# Patient Record
Sex: FEMALE | Race: WHITE | Employment: OTHER | URBAN - METROPOLITAN AREA
[De-identification: names, ages, dates, MRNs, and addresses within clinical notes are randomized per-mention and may not be internally consistent; named-entity substitution may affect disease eponyms.]

---

## 2017-03-06 NOTE — PATIENT DISCUSSION
Discussed AREDS and recommended continue Macular Shield AREDS2 vitamins; recommend daily Amsler grid use; discussed no smoking or second-hand smoke.

## 2017-04-17 NOTE — PATIENT DISCUSSION
Avastin injection # 13 (2 of 2)recommended today after discussion of benefits, risks, alternatives, and off-label use. The injection was administered without complication. Post-injection instructions were reviewed and understood by the patient.

## 2017-04-17 NOTE — PROCEDURE NOTE: CLINICAL
PROCEDURE NOTE: Avastin () (2 of 2 ) OD. Diagnosis: Neovascular AMD with Active CNV. Prep: Betadine Flush. Prior to the original injection, risks/benefits/alternatives discussed including infection, loss of vision, hemorrhage, cataract, glaucoma, retinal tears or detachment. A written consent is on file, and the need for today’s injection was discussed and the patient is understanding and wishes to proceed. The off-label status of Intravitreal Avastin also was reviewed. The patient wished to proceed with treatment. The patient wished to proceed with treatment. Topical anesthetic drops were applied to the eye. Betadine prep was performed. Surgical mask worn. Sterile drape and lid speculum were applied. Using the syringe provided, Avastin 1.25 mg in 0.05 cc was injected into the vitreous cavity. Injection site: 3-4 mm from the limbus. Patient tolerated procedure well. Following the intravitreal injection, the sterile lid speculum was removed. CRA perfusion confirmed. CF vision checked. Patient given office phone number/answering service number and advised to call immediately should there be an increase in floaters or redness, loss of vision or pain, or should they have any other questions or concerns. Richelle Tobar

## 2017-05-09 ENCOUNTER — FOLLOW UP (OUTPATIENT)
Dept: URBAN - METROPOLITAN AREA CLINIC 26 | Facility: CLINIC | Age: 82
End: 2017-05-09

## 2017-05-09 VITALS
BODY MASS INDEX: 29.07 KG/M2 | HEIGHT: 61 IN | SYSTOLIC BLOOD PRESSURE: 158 MMHG | WEIGHT: 154 LBS | HEART RATE: 96 BPM | DIASTOLIC BLOOD PRESSURE: 90 MMHG

## 2017-05-09 DIAGNOSIS — H26.491: ICD-10-CM

## 2017-05-09 DIAGNOSIS — H04.123: ICD-10-CM

## 2017-05-09 DIAGNOSIS — H35.372: ICD-10-CM

## 2017-05-09 DIAGNOSIS — H43.813: ICD-10-CM

## 2017-05-09 DIAGNOSIS — H35.3112: ICD-10-CM

## 2017-05-09 DIAGNOSIS — H35.62: ICD-10-CM

## 2017-05-09 DIAGNOSIS — H35.3223: ICD-10-CM

## 2017-05-09 PROCEDURE — 92134 CPTRZ OPH DX IMG PST SGM RTA: CPT

## 2017-05-09 PROCEDURE — 1036F TOBACCO NON-USER: CPT

## 2017-05-09 PROCEDURE — 92250 FUNDUS PHOTOGRAPHY W/I&R: CPT

## 2017-05-09 PROCEDURE — G8417 CALC BMI ABV UP PARAM F/U: HCPCS

## 2017-05-09 PROCEDURE — 92014 COMPRE OPH EXAM EST PT 1/>: CPT

## 2017-05-09 PROCEDURE — 92235 FLUORESCEIN ANGRPH MLTIFRAME: CPT

## 2017-05-09 PROCEDURE — 4177F TALK PT/CRGVR RE AREDS PREV: CPT

## 2017-05-09 PROCEDURE — 2019F DILATED MACUL EXAM DONE: CPT

## 2017-05-09 PROCEDURE — G8427 DOCREV CUR MEDS BY ELIG CLIN: HCPCS

## 2017-05-09 ASSESSMENT — TONOMETRY
OD_IOP_MMHG: 14
OS_IOP_MMHG: 18

## 2017-05-09 ASSESSMENT — VISUAL ACUITY
OD_SC: 20/30+2
OS_SC: CF 6FT

## 2017-06-12 NOTE — PATIENT DISCUSSION
Swelling worse than prior to last treatment. Will need additional treatment. Sub-retinal swelling is the hardest type of swelling to treat.

## 2017-06-12 NOTE — PATIENT DISCUSSION
Given the poor response to treatment, a change of the Anti-VEGF medication was recommended. Eylea hasn't been used for patient since 2015. Recommend series of 3 Eylea 2 month apart. Jarocho Ragsdale contacted to call patient to call [employer] to help with 10% copay.

## 2017-07-10 NOTE — PATIENT DISCUSSION
Given the poor response to treatment, a change of the Anti-VEGF medication was recommended at last visit. Jono Seymour contacted to call patient to call [employer] to help with 10% copay, **PT DID NOT QUALIFY FOR ASSISTANCE PROGRAM FOR EYLEA AND OWES OVER $200 PER INJECTION OF EYLEA. ** PT SPOKE WITH JARET TOLBERT 2 ABOUT OOP EXPENSE.  WM edu that Eylea DID help as shown on OCT and does rec cont. with at least another one today and change to a SERIES OF 2 INSTEAD OF 3.  at next OV we will re-eval for ? another injection of Eylea vs. Avastin depending on if pt wishes to proceed due to large OOP.

## 2017-07-10 NOTE — PROCEDURE NOTE: CLINICAL
PROCEDURE NOTE: Eylea (2 of 2) #5 OD. Diagnosis: Neovascular AMD with Active CNV. Prep: Betadine Flush. Prior to injection, risks/benefits/alternatives discussed including corneal abrasion, infection, loss of vision, hemorrhage, cataract, glaucoma, retinal tears or detachment. A written consent is on file, and the need for today’s injection was discussed and the patient is understanding and wishes to proceed. The entire vial of Eylea was drawn up into a syringe. The opened vial, remaining drug, and filtered needle were disposed of in a certified biohazard container. Betadine prep was performed. Topical anesthesia was induced with Alcaine. 4% lidocaine pledge. A lid speculum was used. A short 30g needle on a 1cc syringe was used with product that that had previously been prepared under sterile conditions. Injection site: 3-4 mm from the limbus. The used syringe/needle was transferred to a biohazard container. Lid speculum removed. Mask worn during procedure. Patient tolerated procedure well. Count fingers vision was verified. There were no complications. Patient was given the standard instruction sheet. Patient given office phone number/answering service number and advised to call immediately should there be loss of vision or pain, or should they have any other questions or concerns. Mike Max

## 2017-08-21 NOTE — PROCEDURE NOTE: CLINICAL
PROCEDURE NOTE: Avastin () (1 of 3) #14 OD. Diagnosis: Neovascular AMD with Active CNV. Prep: Betadine Flush. Prior to the original injection, risks/benefits/alternatives discussed including infection, loss of vision, hemorrhage, cataract, glaucoma, retinal tears or detachment. A written consent is on file, and the need for today’s injection was discussed and the patient is understanding and wishes to proceed. The off-label status of Intravitreal Avastin also was reviewed. The patient wished to proceed with treatment. The patient wished to proceed with treatment. Topical anesthetic drops were applied to the eye. Betadine prep was performed. Surgical mask worn. Sterile drape and lid speculum were applied. Using the syringe provided, Avastin 1.25 mg in 0.05 cc was injected into the vitreous cavity. Injection site: 3-4 mm from the limbus. Patient tolerated procedure well. Following the intravitreal injection, the sterile lid speculum was removed. CRA perfusion confirmed. CF vision checked. Patient given office phone number/answering service number and advised to call immediately should there be an increase in floaters or redness, loss of vision or pain, or should they have any other questions or concerns. [facility]

## 2017-08-21 NOTE — PATIENT DISCUSSION
last series of Eylea was changed to a series of 2 due to large oop for insurance cost.  Evaluating today to see if still needs tx Eylea vs Avastin.

## 2017-08-21 NOTE — PATIENT DISCUSSION
Worse today despite hx of tx with both drugs.  Rec switch back to Avastin and do a series fo 3 x 1 month apart #14 today (1 of 3).

## 2017-09-18 NOTE — PATIENT DISCUSSION
Avastin #  15 ( 2 of 3) injection recommended today after discussion of benefits, risks, alternatives, and off-label use. The injection was administered without complication. Post-injection instructions were reviewed and understood by the patient.

## 2017-09-18 NOTE — PROCEDURE NOTE: CLINICAL
PROCEDURE NOTE: Avastin () (2 of 3) #15 OD. Diagnosis: Visually Significant PCO. Prior to the original injection, risks/benefits/alternatives discussed including infection, loss of vision, hemorrhage, cataract, glaucoma, retinal tears or detachment. A written consent is on file, and the need for today’s injection was discussed and the patient is understanding and wishes to proceed. The off-label status of Intravitreal Avastin also was reviewed. The patient wished to proceed with treatment. The patient wished to proceed with treatment. Topical anesthetic drops were applied to the eye. Betadine prep was performed. Surgical mask worn. Sterile drape and lid speculum were applied. Using the syringe provided, Avastin 1.25 mg in 0.05 cc was injected into the vitreous cavity. Injection site: 3-4 mm from the limbus. Patient tolerated procedure well. Following the intravitreal injection, the sterile lid speculum was removed. CRA perfusion confirmed. CF vision checked. Patient given office phone number/answering service number and advised to call immediately should there be an increase in floaters or redness, loss of vision or pain, or should they have any other questions or concerns. Raiza Serna

## 2017-10-09 NOTE — PROCEDURE NOTE: CLINICAL
PROCEDURE NOTE: YAG Capsulotomy OS. Diagnosis: Visually Significant PCO. Anesthesia: Topical. Prior to treatment, the risks/benefits/alternatives were discussed. The patient wished to proceed with procedure. Power = 2.8 mJ. Number of pulses = 30. Patient tolerated procedure well. There were no complications. 1 gtt of Alphagan was instilled after laser. Post laser IOP = 19 mmHg. Post-procedure instructions given. Any Pak

## 2017-10-18 NOTE — PATIENT DISCUSSION
The injection was administered without complication. Post-injection instructions were reviewed and understood by the patient.

## 2017-10-18 NOTE — PROCEDURE NOTE: CLINICAL
PROCEDURE NOTE: Avastin () (3 of 3) #16 OD. Diagnosis: Visually Significant PCO. Prior to the original injection, risks/benefits/alternatives discussed including infection, loss of vision, hemorrhage, cataract, glaucoma, retinal tears or detachment. A written consent is on file, and the need for today’s injection was discussed and the patient is understanding and wishes to proceed. The off-label status of Intravitreal Avastin also was reviewed. The patient wished to proceed with treatment. The patient wished to proceed with treatment. Topical anesthetic drops were applied to the eye. Betadine prep was performed. Surgical mask worn. Sterile drape and lid speculum were applied. Using the syringe provided, Avastin 1.25 mg in 0.05 cc was injected into the vitreous cavity. Injection site: 3-4 mm from the limbus. Patient tolerated procedure well. Following the intravitreal injection, the sterile lid speculum was removed. CRA perfusion confirmed. CF vision checked. Patient given office phone number/answering service number and advised to call immediately should there be an increase in floaters or redness, loss of vision or pain, or should they have any other questions or concerns. Amaya Ponce

## 2017-11-16 NOTE — PATIENT DISCUSSION
Advised patient to call our office after her visit with Dr. Gonzalo Villar to schedule her next appt with Dr. Sae Canales in January.

## 2017-11-16 NOTE — PATIENT DISCUSSION
Patient will be traveling for 6 weeks until the new year. Patient stated she will be in [place] in 4 weeks.  Refer to Dr. Kadie Armenta in [place].

## 2017-11-16 NOTE — PATIENT DISCUSSION
Subretinal fluid seen on OCT and exam today.  It is improving since last visit. Recommend a series of 2 with the 1st being done today.

## 2017-11-16 NOTE — PROCEDURE NOTE: CLINICAL
PROCEDURE NOTE: Avastin () #17 OD. Diagnosis: Neovascular AMD with Active CNV. Prep: Betadine Drops and Betadine Scrub. Prior to the original injection, risks/benefits/alternatives discussed including infection, loss of vision, hemorrhage, cataract, glaucoma, retinal tears or detachment. A written consent is on file, and the need for today’s injection was discussed and the patient is understanding and wishes to proceed. The off-label status of Intravitreal Avastin also was reviewed. The patient wished to proceed with treatment. The patient wished to proceed with treatment. Topical anesthetic drops were applied to the eye. Betadine prep was performed. Surgical mask worn. Sterile drape and lid speculum were applied. Using the syringe provided, Avastin 1.25 mg in 0.05 cc was injected into the vitreous cavity. Injection site: 3-4 mm from the limbus. Patient tolerated procedure well. Following the intravitreal injection, the sterile lid speculum was removed. CRA perfusion confirmed. CF vision checked. Patient given office phone number/answering service number and advised to call immediately should there be an increase in floaters or redness, loss of vision or pain, or should they have any other questions or concerns. Karlee Rocha

## 2017-11-17 ENCOUNTER — FOLLOW UP (OUTPATIENT)
Dept: URBAN - METROPOLITAN AREA CLINIC 26 | Facility: CLINIC | Age: 82
End: 2017-11-17

## 2017-11-17 VITALS — HEIGHT: 55 IN | HEART RATE: 88 BPM | SYSTOLIC BLOOD PRESSURE: 116 MMHG | DIASTOLIC BLOOD PRESSURE: 76 MMHG

## 2017-11-17 DIAGNOSIS — H35.372: ICD-10-CM

## 2017-11-17 DIAGNOSIS — H26.491: ICD-10-CM

## 2017-11-17 DIAGNOSIS — H35.3223: ICD-10-CM

## 2017-11-17 DIAGNOSIS — H04.123: ICD-10-CM

## 2017-11-17 DIAGNOSIS — H43.813: ICD-10-CM

## 2017-11-17 DIAGNOSIS — H35.62: ICD-10-CM

## 2017-11-17 DIAGNOSIS — H35.3112: ICD-10-CM

## 2017-11-17 PROCEDURE — 4177F TALK PT/CRGVR RE AREDS PREV: CPT

## 2017-11-17 PROCEDURE — G8417 CALC BMI ABV UP PARAM F/U: HCPCS

## 2017-11-17 PROCEDURE — 1036F TOBACCO NON-USER: CPT

## 2017-11-17 PROCEDURE — G8427 DOCREV CUR MEDS BY ELIG CLIN: HCPCS

## 2017-11-17 PROCEDURE — 92250 FUNDUS PHOTOGRAPHY W/I&R: CPT

## 2017-11-17 PROCEDURE — 2019F DILATED MACUL EXAM DONE: CPT

## 2017-11-17 PROCEDURE — 92014 COMPRE OPH EXAM EST PT 1/>: CPT

## 2017-11-17 ASSESSMENT — TONOMETRY
OD_IOP_MMHG: 12
OS_IOP_MMHG: 17

## 2017-11-17 ASSESSMENT — VISUAL ACUITY
OS_SC: 20/400+1
OD_SC: 20/20-2
OS_PH: 20/400+2

## 2018-01-18 NOTE — PATIENT DISCUSSION
Injection was administered without complication.  Post-injection instructions were reviewed and understood by pt.

## 2018-01-18 NOTE — PATIENT DISCUSSION
Still have subretinal fluid on OCT and exam today.  Recommend Avastin #19 today.  Recommend a series of 2.

## 2018-01-18 NOTE — PATIENT DISCUSSION
Pt moved in October and will be transferring her care to closer to home.  Will give records to her today.

## 2018-01-18 NOTE — PROCEDURE NOTE: CLINICAL
PROCEDURE NOTE: Avastin () #19 OD. Diagnosis: Neovascular AMD with Active CNV. Prior to the original injection, risks/benefits/alternatives discussed including infection, loss of vision, hemorrhage, cataract, glaucoma, retinal tears or detachment. A written consent is on file, and the need for today’s injection was discussed and the patient is understanding and wishes to proceed. The off-label status of Intravitreal Avastin also was reviewed. The patient wished to proceed with treatment. The patient wished to proceed with treatment. Topical anesthetic drops were applied to the eye. Betadine prep was performed. Surgical mask worn. Sterile drape and lid speculum were applied. Using the syringe provided, Avastin 1.25 mg in 0.05 cc was injected into the vitreous cavity. Injection site: 3-4 mm from the limbus. Patient tolerated procedure well. Following the intravitreal injection, the sterile lid speculum was removed. CRA perfusion confirmed. CF vision checked. Patient given office phone number/answering service number and advised to call immediately should there be an increase in floaters or redness, loss of vision or pain, or should they have any other questions or concerns. Emmy Plata

## 2018-05-18 ENCOUNTER — FOLLOW UP (OUTPATIENT)
Dept: URBAN - METROPOLITAN AREA CLINIC 26 | Facility: CLINIC | Age: 83
End: 2018-05-18

## 2018-05-18 VITALS
BODY MASS INDEX: 27.94 KG/M2 | SYSTOLIC BLOOD PRESSURE: 124 MMHG | HEIGHT: 61 IN | WEIGHT: 148 LBS | HEART RATE: 80 BPM | DIASTOLIC BLOOD PRESSURE: 80 MMHG

## 2018-05-18 DIAGNOSIS — H35.372: ICD-10-CM

## 2018-05-18 DIAGNOSIS — H35.3112: ICD-10-CM

## 2018-05-18 DIAGNOSIS — H35.62: ICD-10-CM

## 2018-05-18 DIAGNOSIS — H35.3223: ICD-10-CM

## 2018-05-18 DIAGNOSIS — H43.813: ICD-10-CM

## 2018-05-18 PROCEDURE — 92250 FUNDUS PHOTOGRAPHY W/I&R: CPT

## 2018-05-18 PROCEDURE — 92014 COMPRE OPH EXAM EST PT 1/>: CPT

## 2018-05-18 ASSESSMENT — TONOMETRY
OS_IOP_MMHG: 13
OD_IOP_MMHG: 12

## 2018-05-18 ASSESSMENT — VISUAL ACUITY
OS_SC: 20/400+2
OD_SC: 20/20-1
OS_PH: 20/200-1

## 2018-11-16 ENCOUNTER — FOLLOW UP (OUTPATIENT)
Dept: URBAN - METROPOLITAN AREA CLINIC 26 | Facility: CLINIC | Age: 83
End: 2018-11-16

## 2018-11-16 DIAGNOSIS — H35.3112: ICD-10-CM

## 2018-11-16 DIAGNOSIS — H35.62: ICD-10-CM

## 2018-11-16 DIAGNOSIS — H43.813: ICD-10-CM

## 2018-11-16 DIAGNOSIS — H35.372: ICD-10-CM

## 2018-11-16 DIAGNOSIS — H35.3223: ICD-10-CM

## 2018-11-16 PROCEDURE — 92250 FUNDUS PHOTOGRAPHY W/I&R: CPT

## 2018-11-16 PROCEDURE — 92014 COMPRE OPH EXAM EST PT 1/>: CPT

## 2018-11-16 ASSESSMENT — TONOMETRY
OS_IOP_MMHG: 13
OD_IOP_MMHG: 10

## 2018-11-16 ASSESSMENT — VISUAL ACUITY
OS_SC: 20/400-2
OD_SC: 20/25-2

## 2019-05-24 ENCOUNTER — FOLLOW UP (OUTPATIENT)
Dept: URBAN - METROPOLITAN AREA CLINIC 26 | Facility: CLINIC | Age: 84
End: 2019-05-24

## 2019-05-24 VITALS — BODY MASS INDEX: 25.68 KG/M2 | HEIGHT: 61 IN | WEIGHT: 136 LBS

## 2019-05-24 DIAGNOSIS — H35.62: ICD-10-CM

## 2019-05-24 DIAGNOSIS — H35.372: ICD-10-CM

## 2019-05-24 DIAGNOSIS — H35.3223: ICD-10-CM

## 2019-05-24 DIAGNOSIS — H35.3112: ICD-10-CM

## 2019-05-24 DIAGNOSIS — H26.491: ICD-10-CM

## 2019-05-24 DIAGNOSIS — H43.813: ICD-10-CM

## 2019-05-24 DIAGNOSIS — H04.123: ICD-10-CM

## 2019-05-24 PROCEDURE — 92250 FUNDUS PHOTOGRAPHY W/I&R: CPT

## 2019-05-24 PROCEDURE — 92014 COMPRE OPH EXAM EST PT 1/>: CPT

## 2019-05-24 ASSESSMENT — VISUAL ACUITY
OS_SC: 20/400-2
OD_SC: 20/25+1
OS_PH: 20/200

## 2019-05-24 ASSESSMENT — TONOMETRY
OD_IOP_MMHG: 13
OS_IOP_MMHG: 14

## 2020-01-24 ENCOUNTER — FOLLOW UP (OUTPATIENT)
Dept: URBAN - METROPOLITAN AREA CLINIC 26 | Facility: CLINIC | Age: 85
End: 2020-01-24

## 2020-01-24 VITALS — WEIGHT: 130 LBS | HEIGHT: 61 IN | BODY MASS INDEX: 24.55 KG/M2

## 2020-01-24 DIAGNOSIS — H02.052: ICD-10-CM

## 2020-01-24 DIAGNOSIS — H35.3223: ICD-10-CM

## 2020-01-24 DIAGNOSIS — H35.372: ICD-10-CM

## 2020-01-24 DIAGNOSIS — H43.813: ICD-10-CM

## 2020-01-24 DIAGNOSIS — H35.3112: ICD-10-CM

## 2020-01-24 DIAGNOSIS — H35.62: ICD-10-CM

## 2020-01-24 DIAGNOSIS — H02.055: ICD-10-CM

## 2020-01-24 PROCEDURE — 67820 REVISE EYELASHES: CPT

## 2020-01-24 PROCEDURE — 92250 FUNDUS PHOTOGRAPHY W/I&R: CPT

## 2020-01-24 PROCEDURE — 92014 COMPRE OPH EXAM EST PT 1/>: CPT

## 2020-01-24 ASSESSMENT — VISUAL ACUITY
OS_SC: 20/400-2
OD_SC: 20/30-1

## 2020-01-24 ASSESSMENT — TONOMETRY
OD_IOP_MMHG: 15
OS_IOP_MMHG: 18

## 2020-08-21 ENCOUNTER — FOLLOW UP (OUTPATIENT)
Dept: URBAN - METROPOLITAN AREA CLINIC 26 | Facility: CLINIC | Age: 85
End: 2020-08-21

## 2020-08-21 DIAGNOSIS — H35.3223: ICD-10-CM

## 2020-08-21 DIAGNOSIS — H35.372: ICD-10-CM

## 2020-08-21 DIAGNOSIS — H35.62: ICD-10-CM

## 2020-08-21 DIAGNOSIS — H02.052: ICD-10-CM

## 2020-08-21 DIAGNOSIS — H02.055: ICD-10-CM

## 2020-08-21 DIAGNOSIS — H35.3112: ICD-10-CM

## 2020-08-21 DIAGNOSIS — H43.813: ICD-10-CM

## 2020-08-21 PROCEDURE — 92250 FUNDUS PHOTOGRAPHY W/I&R: CPT

## 2020-08-21 PROCEDURE — 92014 COMPRE OPH EXAM EST PT 1/>: CPT

## 2020-08-21 ASSESSMENT — VISUAL ACUITY
OS_SC: 20/400-2
OD_SC: 20/25

## 2020-08-21 ASSESSMENT — TONOMETRY
OS_IOP_MMHG: 13
OD_IOP_MMHG: 14

## 2021-03-19 ENCOUNTER — FOLLOW UP (OUTPATIENT)
Dept: URBAN - METROPOLITAN AREA CLINIC 26 | Facility: CLINIC | Age: 86
End: 2021-03-19

## 2021-03-19 VITALS — HEIGHT: 61 IN | BODY MASS INDEX: 25.49 KG/M2 | WEIGHT: 135 LBS

## 2021-03-19 DIAGNOSIS — H35.3223: ICD-10-CM

## 2021-03-19 DIAGNOSIS — H35.62: ICD-10-CM

## 2021-03-19 DIAGNOSIS — H35.372: ICD-10-CM

## 2021-03-19 DIAGNOSIS — H35.3112: ICD-10-CM

## 2021-03-19 DIAGNOSIS — H02.055: ICD-10-CM

## 2021-03-19 DIAGNOSIS — H02.052: ICD-10-CM

## 2021-03-19 DIAGNOSIS — H43.813: ICD-10-CM

## 2021-03-19 PROCEDURE — 92250 FUNDUS PHOTOGRAPHY W/I&R: CPT

## 2021-03-19 PROCEDURE — 92014 COMPRE OPH EXAM EST PT 1/>: CPT

## 2021-03-19 ASSESSMENT — TONOMETRY
OS_IOP_MMHG: 12
OD_IOP_MMHG: 15

## 2021-03-19 ASSESSMENT — VISUAL ACUITY
OD_SC: 20/25-1
OS_SC: 20/400-3

## 2021-09-24 ENCOUNTER — FOLLOW UP (OUTPATIENT)
Dept: URBAN - METROPOLITAN AREA CLINIC 26 | Facility: CLINIC | Age: 86
End: 2021-09-24

## 2021-09-24 DIAGNOSIS — H43.813: ICD-10-CM

## 2021-09-24 DIAGNOSIS — H35.372: ICD-10-CM

## 2021-09-24 DIAGNOSIS — H35.62: ICD-10-CM

## 2021-09-24 DIAGNOSIS — H04.123: ICD-10-CM

## 2021-09-24 DIAGNOSIS — H35.3223: ICD-10-CM

## 2021-09-24 DIAGNOSIS — H35.3112: ICD-10-CM

## 2021-09-24 DIAGNOSIS — H26.491: ICD-10-CM

## 2021-09-24 PROCEDURE — 92014 COMPRE OPH EXAM EST PT 1/>: CPT

## 2021-09-24 PROCEDURE — 92134 CPTRZ OPH DX IMG PST SGM RTA: CPT

## 2021-09-24 ASSESSMENT — TONOMETRY
OS_IOP_MMHG: 14
OD_IOP_MMHG: 12

## 2021-09-24 ASSESSMENT — VISUAL ACUITY
OD_PH: 20/20-2
OS_SC: CF 1FT
OD_SC: 20/50

## 2022-02-25 ENCOUNTER — FOLLOW UP (OUTPATIENT)
Dept: URBAN - METROPOLITAN AREA CLINIC 26 | Facility: CLINIC | Age: 87
End: 2022-02-25

## 2022-02-25 VITALS — WEIGHT: 135 LBS | HEIGHT: 61 IN | BODY MASS INDEX: 25.49 KG/M2

## 2022-02-25 DIAGNOSIS — H43.813: ICD-10-CM

## 2022-02-25 DIAGNOSIS — H35.372: ICD-10-CM

## 2022-02-25 DIAGNOSIS — H04.123: ICD-10-CM

## 2022-02-25 DIAGNOSIS — H35.62: ICD-10-CM

## 2022-02-25 DIAGNOSIS — H35.3223: ICD-10-CM

## 2022-02-25 DIAGNOSIS — H35.3112: ICD-10-CM

## 2022-02-25 PROCEDURE — 92134 CPTRZ OPH DX IMG PST SGM RTA: CPT

## 2022-02-25 PROCEDURE — 92014 COMPRE OPH EXAM EST PT 1/>: CPT

## 2022-02-25 ASSESSMENT — TONOMETRY
OS_IOP_MMHG: 17
OD_IOP_MMHG: 14

## 2022-02-25 ASSESSMENT — VISUAL ACUITY
OD_SC: 20/25+2
OS_SC: CF 3FT

## 2022-10-14 ENCOUNTER — FOLLOW UP (OUTPATIENT)
Dept: URBAN - METROPOLITAN AREA CLINIC 26 | Facility: CLINIC | Age: 87
End: 2022-10-14

## 2022-10-14 DIAGNOSIS — H35.3223: ICD-10-CM

## 2022-10-14 DIAGNOSIS — H35.3112: ICD-10-CM

## 2022-10-14 DIAGNOSIS — H35.372: ICD-10-CM

## 2022-10-14 DIAGNOSIS — H02.055: ICD-10-CM

## 2022-10-14 DIAGNOSIS — H35.62: ICD-10-CM

## 2022-10-14 DIAGNOSIS — H02.032: ICD-10-CM

## 2022-10-14 DIAGNOSIS — H43.813: ICD-10-CM

## 2022-10-14 DIAGNOSIS — H04.123: ICD-10-CM

## 2022-10-14 DIAGNOSIS — H02.052: ICD-10-CM

## 2022-10-14 PROCEDURE — 92014 COMPRE OPH EXAM EST PT 1/>: CPT

## 2022-10-14 PROCEDURE — 92134 CPTRZ OPH DX IMG PST SGM RTA: CPT

## 2022-10-14 PROCEDURE — 92250 FUNDUS PHOTOGRAPHY W/I&R: CPT

## 2022-10-14 ASSESSMENT — TONOMETRY
OS_IOP_MMHG: 15
OD_IOP_MMHG: 15

## 2022-10-14 ASSESSMENT — VISUAL ACUITY
OS_SC: 20/400-1
OD_SC: 20/40